# Patient Record
Sex: FEMALE | Race: WHITE | NOT HISPANIC OR LATINO
[De-identification: names, ages, dates, MRNs, and addresses within clinical notes are randomized per-mention and may not be internally consistent; named-entity substitution may affect disease eponyms.]

---

## 2017-09-26 ENCOUNTER — APPOINTMENT (OUTPATIENT)
Dept: CT IMAGING | Facility: HOSPITAL | Age: 52
End: 2017-09-26

## 2019-01-15 ENCOUNTER — EMERGENCY (EMERGENCY)
Facility: HOSPITAL | Age: 54
LOS: 1 days | Discharge: HOME | End: 2019-01-15
Attending: EMERGENCY MEDICINE | Admitting: EMERGENCY MEDICINE
Payer: COMMERCIAL

## 2019-01-15 VITALS
TEMPERATURE: 98 F | OXYGEN SATURATION: 100 % | HEIGHT: 64 IN | RESPIRATION RATE: 18 BRPM | DIASTOLIC BLOOD PRESSURE: 98 MMHG | HEART RATE: 81 BPM | WEIGHT: 134.92 LBS | SYSTOLIC BLOOD PRESSURE: 143 MMHG

## 2019-01-15 VITALS
OXYGEN SATURATION: 98 % | HEART RATE: 74 BPM | DIASTOLIC BLOOD PRESSURE: 81 MMHG | RESPIRATION RATE: 16 BRPM | SYSTOLIC BLOOD PRESSURE: 130 MMHG

## 2019-01-15 DIAGNOSIS — S09.90XA UNSPECIFIED INJURY OF HEAD, INITIAL ENCOUNTER: ICD-10-CM

## 2019-01-15 PROCEDURE — 72125 CT NECK SPINE W/O DYE: CPT

## 2019-01-15 PROCEDURE — 99284 EMERGENCY DEPT VISIT MOD MDM: CPT | Mod: 25

## 2019-01-15 PROCEDURE — 72125 CT NECK SPINE W/O DYE: CPT | Mod: 26

## 2019-01-15 PROCEDURE — 70450 CT HEAD/BRAIN W/O DYE: CPT | Mod: 26

## 2019-01-15 PROCEDURE — 70450 CT HEAD/BRAIN W/O DYE: CPT

## 2019-01-15 PROCEDURE — 99284 EMERGENCY DEPT VISIT MOD MDM: CPT
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
## 2019-01-15 NOTE — ED PROVIDER NOTE - NSFOLLOWUPINSTRUCTIONS_ED_ALL_ED_FT
Return to the ER should you have worsening pain or vomiting or concerns.     Take Tylenol 650mg every 6 hours as needed.     Follow up with your primary care doctor within the week.     Thank you.

## 2019-01-15 NOTE — ED PROVIDER NOTE - SKIN, MLM
Skin normal color for race, warm, dry and intact. No evidence of rash. There is small scalp hematoma (pt states it was larger and has improved throughout the week) to the left posterior occiput.

## 2019-01-15 NOTE — ED ADULT NURSE NOTE - CHPI ED NUR SYMPTOMS POS
HEADACHE/s/p fall January 5th while walking to the house "I fell and hit the back of my head". Pt denies LOC/DIZZINESS/PAIN

## 2019-01-15 NOTE — ED ADULT NURSE NOTE - NS ED NURSE LEVEL OF CONSCIOUSNESS ORIENTATION
Oriented - self; Oriented - place; Oriented - time Additional Anesthesia Volume In Cc (Will Not Render If 0): 0

## 2021-03-22 ENCOUNTER — TRANSCRIPTION ENCOUNTER (OUTPATIENT)
Age: 56
End: 2021-03-22

## 2022-04-20 NOTE — ED ADULT NURSE NOTE - PAIN: PRESENCE, MLM
What Type Of Note Output Would You Prefer (Optional)?: Standard Output How Severe Is Your Rash?: moderate Is This A New Presentation, Or A Follow-Up?: Rash complains of pain/discomfort

## 2023-03-11 ENCOUNTER — NON-APPOINTMENT (OUTPATIENT)
Age: 58
End: 2023-03-11